# Patient Record
Sex: MALE | Race: WHITE | NOT HISPANIC OR LATINO | URBAN - METROPOLITAN AREA
[De-identification: names, ages, dates, MRNs, and addresses within clinical notes are randomized per-mention and may not be internally consistent; named-entity substitution may affect disease eponyms.]

---
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
## 2024-09-26 ENCOUNTER — APPOINTMENT (RX ONLY)
Dept: URBAN - METROPOLITAN AREA CLINIC 39 | Facility: CLINIC | Age: 24
Setting detail: DERMATOLOGY
End: 2024-09-26

## 2024-09-26 DIAGNOSIS — L64.8 OTHER ANDROGENIC ALOPECIA: ICD-10-CM

## 2024-09-26 PROBLEM — L65.9 NONSCARRING HAIR LOSS, UNSPECIFIED: Status: ACTIVE | Noted: 2024-09-26

## 2024-09-26 PROCEDURE — ? COUNSELING

## 2024-09-26 PROCEDURE — ? PRESCRIPTION MEDICATION MANAGEMENT

## 2024-09-26 PROCEDURE — ? PRESCRIPTION

## 2024-09-26 PROCEDURE — 99204 OFFICE O/P NEW MOD 45 MIN: CPT

## 2024-09-26 PROCEDURE — ? ORDER TESTS

## 2024-09-26 RX ORDER — MINOXIDIL 2.5 MG/1
1 TABLET ORAL AS DIRECTED
Qty: 90 | Refills: 0 | Status: ERX | COMMUNITY
Start: 2024-09-26

## 2024-09-26 RX ADMIN — MINOXIDIL 1: 2.5 TABLET ORAL at 00:00

## 2024-09-26 ASSESSMENT — LOCATION SIMPLE DESCRIPTION DERM: LOCATION SIMPLE: ANTERIOR SCALP

## 2024-09-26 ASSESSMENT — LOCATION ZONE DERM: LOCATION ZONE: SCALP

## 2024-09-26 ASSESSMENT — LOCATION DETAILED DESCRIPTION DERM: LOCATION DETAILED: MID-FRONTAL SCALP

## 2024-09-26 NOTE — PROCEDURE: ORDER TESTS
Bill For Surgical Tray: no
Billing Type: Third-Party Bill
Performing Laboratory: [id number]
Lab Facility: [id number]
Expected Date Of Service: 09/26/2024

## 2024-09-26 NOTE — PROCEDURE: PRESCRIPTION MEDICATION MANAGEMENT
Render In Strict Bullet Format?: No
Initiate Treatment: minoxidil 2.5 mg tablet As directed\\nQuantity: 90.0 Tablet  Days Supply: 90\\nSig: Take 1/2 tab po qd for 2 weeks, if tolerated take 1/2 po bid
Detail Level: Zone
Plan: **Recheck in 3mo, labs ordered

## 2024-12-27 ENCOUNTER — APPOINTMENT (OUTPATIENT)
Dept: URBAN - METROPOLITAN AREA CLINIC 39 | Facility: CLINIC | Age: 24
Setting detail: DERMATOLOGY
End: 2024-12-27

## 2024-12-27 DIAGNOSIS — L64.8 OTHER ANDROGENIC ALOPECIA: ICD-10-CM

## 2024-12-27 PROBLEM — L65.9 NONSCARRING HAIR LOSS, UNSPECIFIED: Status: ACTIVE | Noted: 2024-12-27

## 2024-12-27 PROCEDURE — ? COUNSELING

## 2024-12-27 PROCEDURE — 99213 OFFICE O/P EST LOW 20 MIN: CPT

## 2024-12-27 PROCEDURE — ? PRESCRIPTION MEDICATION MANAGEMENT

## 2024-12-27 ASSESSMENT — LOCATION DETAILED DESCRIPTION DERM: LOCATION DETAILED: MID-FRONTAL SCALP

## 2024-12-27 ASSESSMENT — LOCATION SIMPLE DESCRIPTION DERM: LOCATION SIMPLE: ANTERIOR SCALP

## 2024-12-27 ASSESSMENT — LOCATION ZONE DERM: LOCATION ZONE: SCALP

## 2024-12-27 NOTE — PROCEDURE: PRESCRIPTION MEDICATION MANAGEMENT
Render In Strict Bullet Format?: No
Detail Level: Zone
Plan: Patient discontinued minoxidil and saw more improvement than when on minoxidil. Discussed availability of Finasteride. Patient declines treatment today, wishes to discontinue all meds, and will contact the office for a refill if desired.
Discontinue Regimen: minoxidil 2.5 mg tablet: Take 1/2 tab po qd for 2 weeks, if tolerated take 1/2 po bid